# Patient Record
Sex: FEMALE | URBAN - METROPOLITAN AREA
[De-identification: names, ages, dates, MRNs, and addresses within clinical notes are randomized per-mention and may not be internally consistent; named-entity substitution may affect disease eponyms.]

---

## 2018-10-24 ENCOUNTER — APPOINTMENT (RX ONLY)
Dept: URBAN - METROPOLITAN AREA CLINIC 166 | Facility: CLINIC | Age: 58
Setting detail: DERMATOLOGY
End: 2018-10-24

## 2018-10-24 DIAGNOSIS — Z41.9 ENCOUNTER FOR PROCEDURE FOR PURPOSES OTHER THAN REMEDYING HEALTH STATE, UNSPECIFIED: ICD-10-CM

## 2018-10-24 DIAGNOSIS — H01.13 ECZEMATOUS DERMATITIS OF EYELID: ICD-10-CM

## 2018-10-24 DIAGNOSIS — L57.8 OTHER SKIN CHANGES DUE TO CHRONIC EXPOSURE TO NONIONIZING RADIATION: ICD-10-CM

## 2018-10-24 PROBLEM — H01.139 ECZEMATOUS DERMATITIS OF UNSPECIFIED EYE, UNSPECIFIED EYELID: Status: ACTIVE | Noted: 2018-10-24

## 2018-10-24 PROBLEM — E03.9 HYPOTHYROIDISM, UNSPECIFIED: Status: ACTIVE | Noted: 2018-10-24

## 2018-10-24 PROCEDURE — ? COSMETIC CONSULTATION: FILLERS

## 2018-10-24 PROCEDURE — ? TREATMENT REGIMEN

## 2018-10-24 PROCEDURE — ? BOTOX

## 2018-10-24 PROCEDURE — ? COSMETIC CONSULTATION: BOTULINUM TOXIN

## 2018-10-24 PROCEDURE — ? PRESCRIPTION

## 2018-10-24 PROCEDURE — ? COUNSELING

## 2018-10-24 PROCEDURE — ? PATIENT SPECIFIC COUNSELING

## 2018-10-24 RX ORDER — PHARMACY COMPOUNDING ACCESSORY
EACH MISCELLANEOUS
Qty: 1 | Refills: 3 | Status: ERX | COMMUNITY
Start: 2018-10-24

## 2018-10-24 RX ADMIN — Medication: at 00:00

## 2018-10-24 ASSESSMENT — LOCATION ZONE DERM: LOCATION ZONE: FACE

## 2018-10-24 ASSESSMENT — LOCATION SIMPLE DESCRIPTION DERM
LOCATION SIMPLE: RIGHT EYEBROW
LOCATION SIMPLE: LEFT FOREHEAD
LOCATION SIMPLE: LEFT EYEBROW
LOCATION SIMPLE: INFERIOR FOREHEAD

## 2018-10-24 ASSESSMENT — LOCATION DETAILED DESCRIPTION DERM
LOCATION DETAILED: RIGHT CENTRAL EYEBROW
LOCATION DETAILED: LEFT SUPERIOR FOREHEAD
LOCATION DETAILED: INFERIOR MID FOREHEAD
LOCATION DETAILED: LEFT MEDIAL FOREHEAD
LOCATION DETAILED: LEFT CENTRAL EYEBROW

## 2018-10-24 NOTE — PROCEDURE: BOTOX
Additional Area 1 Location: Face
Additional Area 1 Units: 40
Lot #: [id number]
Additional Area 6 Units: 0
Dilution (U/0.1 Cc): 1
Detail Level: Zone
Price (Use Numbers Only, No Special Characters Or $): 380
Expiration Date (Month Year): 4/21
Additional Area 2 Location: Back
Post-Care Instructions: Patient instructed to not lie down for 4 hours and limit physical activity for 24 hours. Patient instructed not to travel by airplane for 48 hours.
Consent: Written consent obtained. Risks include but not limited to lid/brow ptosis, bruising, swelling, diplopia, temporary effect, incomplete chemical denervation.

## 2018-10-24 NOTE — PROCEDURE: TREATMENT REGIMEN
Plan: Avoid nail products and fragranced products
Detail Level: Simple
Otc Regimen: Hydrocortisone twice a day x 1 week

## 2018-10-31 ENCOUNTER — APPOINTMENT (RX ONLY)
Dept: URBAN - METROPOLITAN AREA CLINIC 166 | Facility: CLINIC | Age: 58
Setting detail: DERMATOLOGY
End: 2018-10-31

## 2018-10-31 ENCOUNTER — RX ONLY (OUTPATIENT)
Age: 58
Setting detail: RX ONLY
End: 2018-10-31

## 2018-10-31 DIAGNOSIS — Z41.9 ENCOUNTER FOR PROCEDURE FOR PURPOSES OTHER THAN REMEDYING HEALTH STATE, UNSPECIFIED: ICD-10-CM

## 2018-10-31 PROCEDURE — ? FILLERS

## 2018-10-31 RX ORDER — LIDOCAINE, PRILOCAINE 25; 25 MG/G; MG/G
CREAM TOPICAL
Qty: 1 | Refills: 3 | Status: ERX | COMMUNITY
Start: 2018-10-31

## 2018-10-31 ASSESSMENT — LOCATION DETAILED DESCRIPTION DERM: LOCATION DETAILED: LEFT CENTRAL MALAR CHEEK

## 2018-10-31 ASSESSMENT — LOCATION SIMPLE DESCRIPTION DERM: LOCATION SIMPLE: LEFT CHEEK

## 2018-10-31 ASSESSMENT — LOCATION ZONE DERM: LOCATION ZONE: FACE

## 2018-10-31 NOTE — PROCEDURE: FILLERS
Decollete Filler  Volume In Cc: 0
Price (Use Numbers Only, No Special Characters Or $): 9773
Include Cannula Size?: 25G
Include Cannula Information In Note?: No
Expiration Date (Month Year): 1/9/19
Filler: Juvederm Ultra XC
Expiration Date (Month Year): 8/3/19
Detail Level: Zone
Expiration Date (Month Year): 10/2/19
Lot #: [id number] Megan Gibbs M.D. JONAS***
Map Statment: See Attach Map for Complete Details
Additional Area 1 Location: face
Include Cannula Length?: 1.5 inch
Anesthesia Volume In Cc: 1
Post-Care Instructions: Patient instructed to apply ice to reduce swelling.
Lot #: [id number]
Anesthesia Type: 1% lidocaine with epinephrine and a 1:10 solution of 8.4% sodium bicarbonate
Filler: Voluma
Lot #: [id number]
Consent: Written consent obtained. Risks include but not limited to bruising, beading, irregular texture, ulceration, infection, allergic reaction, scar formation, incomplete augmentation, temporary nature, procedural pain.

## 2018-11-26 ENCOUNTER — APPOINTMENT (RX ONLY)
Dept: URBAN - METROPOLITAN AREA CLINIC 170 | Facility: CLINIC | Age: 58
Setting detail: DERMATOLOGY
End: 2018-11-26

## 2018-11-26 DIAGNOSIS — Z41.9 ENCOUNTER FOR PROCEDURE FOR PURPOSES OTHER THAN REMEDYING HEALTH STATE, UNSPECIFIED: ICD-10-CM

## 2018-11-26 PROCEDURE — ? OTHER (COSMETIC)

## 2018-11-26 NOTE — PROCEDURE: OTHER (COSMETIC)
Detail Level: Zone
Other (Free Text): Patient is happy with results s/p Botox and filler injections. Will plan Botox mid February. Will plan to do fine lines on lower face with Botox appointment.

## 2019-05-14 ENCOUNTER — APPOINTMENT (RX ONLY)
Dept: URBAN - METROPOLITAN AREA CLINIC 170 | Facility: CLINIC | Age: 59
Setting detail: DERMATOLOGY
End: 2019-05-14

## 2019-05-14 DIAGNOSIS — Z41.9 ENCOUNTER FOR PROCEDURE FOR PURPOSES OTHER THAN REMEDYING HEALTH STATE, UNSPECIFIED: ICD-10-CM

## 2019-05-14 DIAGNOSIS — L57.8 OTHER SKIN CHANGES DUE TO CHRONIC EXPOSURE TO NONIONIZING RADIATION: ICD-10-CM

## 2019-05-14 PROCEDURE — ? BOTOX

## 2019-05-14 PROCEDURE — ? FILLERS

## 2019-05-14 PROCEDURE — ? TREATMENT REGIMEN

## 2019-05-14 ASSESSMENT — LOCATION SIMPLE DESCRIPTION DERM
LOCATION SIMPLE: LEFT CHEEK
LOCATION SIMPLE: LEFT FOREHEAD

## 2019-05-14 ASSESSMENT — LOCATION DETAILED DESCRIPTION DERM
LOCATION DETAILED: LEFT INFERIOR CENTRAL MALAR CHEEK
LOCATION DETAILED: LEFT MEDIAL FOREHEAD

## 2019-05-14 ASSESSMENT — LOCATION ZONE DERM: LOCATION ZONE: FACE

## 2019-05-14 NOTE — PROCEDURE: FILLERS
Additional Area 4 Volume In Cc: 0
Lot #: [id number]
Map Statment: See Attach Map for Complete Details
Consent: Written consent obtained. Risks include but not limited to bruising, beading, irregular texture, ulceration, infection, allergic reaction, scar formation, incomplete augmentation, temporary nature, procedural pain.
Expiration Date (Month Year): 12/31/19
Lot #: [id number] Megan Gibbs M.D. JONAS***
Post-Care Instructions: Patient instructed to apply ice to reduce swelling.
Include Cannula Information In Note?: No
Anesthesia Type: 1% lidocaine with epinephrine and a 1:10 solution of 8.4% sodium bicarbonate
Expiration Date (Month Year): 1/9/19
Anesthesia Volume In Cc: 0.3
Include Cannula Size?: 25G
Additional Area 1 Location: Face **[facility] SPECIAL**
Additional Area 1 Location: Face
Include Cannula Length?: 1.5 inch
Additional Area 1 Volume In Cc: 1
Topical Anesthesia?: EMLA
Filler: Restylane Refyne
Detail Level: Zone
Expiration Date (Month Year): 5/15/19
Price (Use Numbers Only, No Special Characters Or $): 587
Lot #: [id number]

## 2019-05-14 NOTE — PROCEDURE: BOTOX
Inferior Lateral Orbicularis Oculi Units: 0
Dilution (U/0.1 Cc): 1
Post-Care Instructions: Patient instructed to not lie down for 4 hours and limit physical activity for 24 hours. Patient instructed not to travel by airplane for 48 hours.
Price (Use Numbers Only, No Special Characters Or $): 577
Additional Area 1 Location: Face
Lot #: [id number]
Consent: Written consent obtained. Risks include but not limited to lid/brow ptosis, bruising, swelling, diplopia, temporary effect, incomplete chemical denervation.
Detail Level: Zone
Additional Area 1 Units: 44
Expiration Date (Month Year): 9/21

## 2019-05-14 NOTE — PROCEDURE: TREATMENT REGIMEN
Plan: Recommend [employer] for reapplication of SPF during golfing
Samples Given: [employer]
Detail Level: Zone

## 2019-10-21 ENCOUNTER — APPOINTMENT (RX ONLY)
Dept: URBAN - METROPOLITAN AREA CLINIC 170 | Facility: CLINIC | Age: 59
Setting detail: DERMATOLOGY
End: 2019-10-21

## 2019-10-21 DIAGNOSIS — L90.8 OTHER ATROPHIC DISORDERS OF SKIN: ICD-10-CM

## 2019-10-21 DIAGNOSIS — F43.0 ACUTE STRESS REACTION: ICD-10-CM

## 2019-10-21 DIAGNOSIS — Z41.9 ENCOUNTER FOR PROCEDURE FOR PURPOSES OTHER THAN REMEDYING HEALTH STATE, UNSPECIFIED: ICD-10-CM

## 2019-10-21 PROCEDURE — ? COUNSELING

## 2019-10-21 PROCEDURE — ? FILLERS

## 2019-10-21 PROCEDURE — ? BOTOX

## 2019-10-21 PROCEDURE — ? TREATMENT REGIMEN

## 2019-10-21 NOTE — PROCEDURE: COUNSELING
Detail Level: Detailed
Patient Specific Counseling (Will Not Stick From Patient To Patient): Discussed propanol for anxiety prior to procedures  Patient advised to follow up with pcp for prescription .

## 2019-10-21 NOTE — PROCEDURE: TREATMENT REGIMEN
Plan: ZO rositrol under [place] SPF/ [place]ven[place] 0.1 cream under moisturizer nightly as tolerated
Detail Level: Simple

## 2019-10-21 NOTE — PROCEDURE: FILLERS
[place] Filler  Volume In Cc: 0
Lot #: [id number]
Anesthesia Volume In Cc: 0.2
Include Cannula Size?: 25G
Expiration Date (Month Year): 5/31/2020
Include Cannula Information In Note?: No
Topical Anesthesia?: EMLA
Detail Level: Zone
Additional Area 1 Location: Face
Include Cannula Length?: 1.5 inch
Lot #: [id number] Megan Gibbs M.D. JONAS***
Price (Use Numbers Only, No Special Characters Or $): 998
Expiration Date (Month Year): 1/9/19
Filler: Voluma
Map Statment: See Attach Map for Complete Details
Cheeks Filler Volume In Cc: 1
Anesthesia Type: 1% lidocaine with epinephrine and a 1:10 solution of 8.4% sodium bicarbonate
Filler Comments: Was not able to inject with cannula, reverted back to the needle
Post-Care Instructions: Patient instructed to apply ice to reduce swelling.
Lot #: [id number]
Consent: Written consent obtained. Risks include but not limited to bruising, beading, irregular texture, ulceration, infection, allergic reaction, scar formation, incomplete augmentation, temporary nature, procedural pain.
Expiration Date (Month Year): 12/07/20
Include Cannula Information In Note?: Yes

## 2019-10-21 NOTE — PROCEDURE: BOTOX
Lateral Platysmal Bands Units: 0
Dilution (U/0.1 Cc): 1
Show Lateral Platysmal Band Units: Yes
Show Right And Left Periorbital Units: No
Lot #: [id number]
Consent: Written consent obtained. Risks include but not limited to lid/brow ptosis, bruising, swelling, diplopia, temporary effect, incomplete chemical denervation.
Additional Area 1 Location: Face
Post-Care Instructions: Patient instructed to not lie down for 4 hours and limit physical activity for 24 hours. Patient instructed not to travel by airplane for 48 hours.
Detail Level: Zone
Expiration Date (Month Year): 02/22
Price (Use Numbers Only, No Special Characters Or $): 407

## 2021-05-07 ENCOUNTER — APPOINTMENT (RX ONLY)
Dept: URBAN - METROPOLITAN AREA CLINIC 173 | Facility: CLINIC | Age: 61
Setting detail: DERMATOLOGY
End: 2021-05-07

## 2021-05-07 ENCOUNTER — RX ONLY (OUTPATIENT)
Age: 61
Setting detail: RX ONLY
End: 2021-05-07

## 2021-05-07 DIAGNOSIS — I78.8 OTHER DISEASES OF CAPILLARIES: ICD-10-CM

## 2021-05-07 DIAGNOSIS — Z41.9 ENCOUNTER FOR PROCEDURE FOR PURPOSES OTHER THAN REMEDYING HEALTH STATE, UNSPECIFIED: ICD-10-CM

## 2021-05-07 PROCEDURE — ? PATIENT SPECIFIC COUNSELING

## 2021-05-07 PROCEDURE — ? FILLERS

## 2021-05-07 PROCEDURE — ? BOTOX

## 2021-05-07 RX ORDER — LIDOCAINE AND PRILOCAINE 25; 25 MG/G; MG/G
CREAM TOPICAL
Qty: 1 | Refills: 3 | Status: ERX

## 2021-05-07 ASSESSMENT — LOCATION ZONE DERM: LOCATION ZONE: FACE

## 2021-05-07 ASSESSMENT — LOCATION SIMPLE DESCRIPTION DERM: LOCATION SIMPLE: INFERIOR FOREHEAD

## 2021-05-07 ASSESSMENT — LOCATION DETAILED DESCRIPTION DERM: LOCATION DETAILED: INFERIOR MID FOREHEAD

## 2021-05-07 NOTE — PROCEDURE: FILLERS
Additional Area 3 Volume In Cc: 0
Additional Area 1 Location: Face
Expiration Date (Month Year): 08/04/2021
Include Cannula Size?: 25G
Include Cannula Length?: 1.5 inch
Include Cannula Information In Note?: Yes
Additional Area 1 Volume In Cc: 1
Detail Level: Zone
Additional Area 2 Location: Hands
Filler: Restylane Silk
Price (Use Numbers Only, No Special Characters Or $): 900
Additional Area 3 Location: chin
Use Map Statement For Sites (Optional): No
Additional Area 2 Location: ear lobes
Map Statment: See Attach Map for Complete Details
Anesthesia Type: 1% lidocaine with epinephrine and a 1:10 solution of 8.4% sodium bicarbonate
Lot #: [id number]
Lot #: [id number]
Expiration Date (Month Year): 08/31/2020
Expiration Date (Month Year): 1-31-23
Consent: Written consent obtained. Risks include but not limited to bruising, beading, irregular texture, ulceration, infection, allergic reaction, scar formation, incomplete augmentation, temporary nature, procedural pain.
Topical Anesthesia?: EMLA
Post-Care Instructions: Patient instructed to apply ice to reduce swelling.
Lot #: [id number]

## 2021-05-07 NOTE — PROCEDURE: BOTOX
Right Periorbital Units: 0
Consent: Written consent obtained. Risks include but not limited to lid/brow ptosis, bruising, swelling, diplopia, temporary effect, incomplete chemical denervation.
Show Right And Left Pupillary Line Units: No
Lot #: [id number]
Show Additional Area 6: Yes
Price (Use Numbers Only, No Special Characters Or $): 105
Post-Care Instructions: Patient instructed to not lie down for 4 hours and limit physical activity for 24 hours. Patient instructed not to travel by airplane for 48 hours.
Additional Area 3 Location: Chin
Dilution (U/0.1 Cc): 1
Additional Area 1 Units: 49
Expiration Date (Month Year): 7/23
Additional Area 1 Location: Face
Detail Level: Zone
Additional Area 2 Location: Upper cutaneous lip